# Patient Record
Sex: MALE | Race: BLACK OR AFRICAN AMERICAN | HISPANIC OR LATINO | Employment: UNEMPLOYED | URBAN - METROPOLITAN AREA
[De-identification: names, ages, dates, MRNs, and addresses within clinical notes are randomized per-mention and may not be internally consistent; named-entity substitution may affect disease eponyms.]

---

## 2023-05-28 ENCOUNTER — HOSPITAL ENCOUNTER (EMERGENCY)
Facility: HOSPITAL | Age: 33
Discharge: HOME | End: 2023-05-28
Attending: EMERGENCY MEDICINE | Admitting: EMERGENCY MEDICINE

## 2023-05-28 VITALS
TEMPERATURE: 99.1 F | OXYGEN SATURATION: 99 % | WEIGHT: 215.5 LBS | HEART RATE: 94 BPM | RESPIRATION RATE: 18 BRPM | SYSTOLIC BLOOD PRESSURE: 138 MMHG | DIASTOLIC BLOOD PRESSURE: 88 MMHG

## 2023-05-28 DIAGNOSIS — J06.9 UPPER RESPIRATORY TRACT INFECTION, UNSPECIFIED TYPE: Primary | ICD-10-CM

## 2023-05-28 LAB
FLUAV RNA RESP QL NAA+PROBE: NEGATIVE
FLUBV RNA RESP QL NAA+PROBE: NEGATIVE
RSV RNA RESP QL NAA+PROBE: NEGATIVE
S PYO DNA THROAT QL NAA+PROBE: NOT DETECTED
SARS-COV-2 RNA RESP QL NAA+PROBE: NEGATIVE

## 2023-05-28 RX ORDER — ERYTHROMYCIN 5 MG/G
OINTMENT OPHTHALMIC
Qty: 3.5 G | Refills: 0 | OUTPATIENT
Start: 2023-05-28 | End: 2023-06-01

## 2023-05-28 NOTE — Clinical Note
Naya Angle was seen and treated in our emergency department on 5/28/2023  No restrictions            Diagnosis:     Rogers    He may return on this date: 05/29/2023         If you have any questions or concerns, please don't hesitate to call        Kylah Singh PA-C    ______________________________           _______________          _______________  Hospital Representative                              Date                                Time

## 2023-05-28 NOTE — ED PROVIDER NOTES
HPI:    Patient is a 26-year-old male, comes in having flulike symptoms, as well as bilateral eye erythema  Patient is in no acute distress at this time  No fever, no nausea, no vomiting  Has been taking Visine without relief      No Known Allergies    History reviewed  No pertinent past medical history  History reviewed  No pertinent surgical history  Social History     Tobacco Use   • Smoking status: Never   • Smokeless tobacco: Never   Substance Use Topics   • Alcohol use: Yes   • Drug use: Never         Nursing notes reviewed        ED Triage Vitals [05/28/23 1708]   Temperature Pulse Respirations Blood Pressure SpO2   99 1 °F (37 3 °C) 94 18 138/88 99 %      Temp Source Heart Rate Source Patient Position - Orthostatic VS BP Location FiO2 (%)   Tympanic Monitor Sitting Left arm --      Pain Score       --           ROS: Positive for conjunctivits, sore throat, fatigue, the remainder of a 10 organ system ROS was otherwise unremarkable  Physical Exam:  General: awake, alert, no acute distress  Head: normocephalic, atraumatic  Eyes: no scleral icterus  Ears: external ears normal, hearing grossly intact  Nose: external exam grossly normal  Mouth: negative tonsillar swelling, positive tonsillar erythema, negative tonsillar exudate  Neck: symmetric, No JVD noted, trachea midline  Pulmonary: no respiratory distress, no tachypnea noted  Cardiovascular: appears well perfused  Abdomen: no distention noted, negative abdominal tenderness  Musculoskeletal: no deformities noted, tone normal  Neuro: grossly non-focal  Psych: mood and affect appropriate      No orders to display              Labs Reviewed - No data to display    Visit Vitals  /88 (BP Location: Left arm)   Pulse 94   Temp 99 1 °F (37 3 °C) (Tympanic)   Resp 18   Wt 97 8 kg (215 lb 8 oz)   SpO2 99%   Smoking Status Never                     MDM:    Patient came in for viral-like symptoms  Tested for COVID, flu, RSV, and strep    Given "erythromycin ointment for bilateral eye erythema  Discharged home with follow-up to family doctor as needed      Counseling: I had a detailed discussion with the patient and/or guardian regarding: the historical points, exam findings, and any diagnostic results supporting the discharge diagnosis, lab results, radiology results, discharge instructions reviewed with patient and/or family/caregiver and understanding was verbalized  Instructions given to return to the emergency department if symptoms worsen or persist, or if there are any questions or concerns that arise at home  All labs reviewed and utilized in the medical decision making process     All radiology studies independently viewed by me and interpreted by the radiologist     Portions of the record may have been created with voice recognition software  Occasional wrong word or \"sound a like\" substitutions may have occurred due to the inherent limitations of voice recognition software  Read the chart carefully and recognize, using context, where substitutions have occurred  Medications - No data to display  Final diagnoses:   Upper respiratory tract infection, unspecified type     Time reflects when diagnosis was documented in both MDM as applicable and the Disposition within this note     Time User Action Codes Description Comment    5/28/2023  5:16 PM Christin Roberts Add [J06 9] Upper respiratory tract infection, unspecified type       ED Disposition     ED Disposition   Discharge    Condition   Stable    Date/Time   Sun May 28, 2023  5:16 PM    Comment   Rogers Gay discharge to home/self care                 Follow-up Information     Follow up With Specialties Details Why Contact Info Additional [id number] [facility] Emergency Department Emergency Medicine  As needed, If symptoms worsen [address] [id number] [address] [facility] Emergency Department    " Discharge Medication List as of 5/28/2023  5:16 PM      START taking these medications    Details   erythromycin (ILOTYCIN) ophthalmic ointment Place a 1/2 inch ribbon of ointment into the lower eyelid  Q6hrs, Print           No discharge procedures on file      Electronically Signed by               Linda Worthy PA-C  05/28/23 6758

## 2023-06-01 ENCOUNTER — HOSPITAL ENCOUNTER (EMERGENCY)
Facility: HOSPITAL | Age: 33
Discharge: HOME | End: 2023-06-01
Attending: EMERGENCY MEDICINE | Admitting: EMERGENCY MEDICINE

## 2023-06-01 VITALS
RESPIRATION RATE: 16 BRPM | DIASTOLIC BLOOD PRESSURE: 93 MMHG | HEART RATE: 83 BPM | WEIGHT: 215 LBS | TEMPERATURE: 98.6 F | OXYGEN SATURATION: 98 % | SYSTOLIC BLOOD PRESSURE: 138 MMHG

## 2023-06-01 DIAGNOSIS — Z91.09 ENVIRONMENTAL ALLERGIES: Primary | ICD-10-CM

## 2023-06-01 PROCEDURE — 99282 EMERGENCY DEPT VISIT SF MDM: CPT

## 2023-06-01 RX ORDER — OLOPATADINE HYDROCHLORIDE 1 MG/ML
1 SOLUTION/ DROPS OPHTHALMIC 2 TIMES DAILY
Qty: 5 ML | Refills: 0 | Status: SHIPPED | OUTPATIENT
Start: 2023-06-01

## 2023-06-01 RX ORDER — FLUTICASONE PROPIONATE 50 MCG
2 SPRAY, SUSPENSION (ML) NASAL DAILY
Qty: 16 G | Refills: 0 | Status: SHIPPED | OUTPATIENT
Start: 2023-06-01

## 2023-06-02 NOTE — DISCHARGE INSTRUCTIONS
llergies  QUE SOUHAITEZ-VOUS SAVOIR:  Les allergies sont une réaction du système immunitaire à une substance appelée allergène  Votre système immunitaire considère l'allergène comme nocif et [employer]  Une réaction allergique peut être bénigne ou potentiellement mortelle  Une réaction potentiellement mortelle est appelée anaphylaxie  L'anaphylaxie est une réaction soudaine et potentiellement mortelle abelino nécessite un traitement immédiat  INSTRUCTIONS DE DÉCHARGE :  Composez le 911 pour [employer] ou sivakumar symptômes d'anaphylaxie, tels que sivakumar difficultés respiratoires, un gonflement de la bouche ou de la gorge ou une respiration sifflante  Vous pouvez Bill Vogt, une éruption cutanée, de l'urticaire ou avoir l'impression que vous allez vous évanouir  Retournez aux urgences si :   Vous avez sivakumar [employer] [facility] irineo ou les pieds  Votre peau est rouge ou rouge  Communiquez avec votre fournisseur de soins de santé si :   Juaquin Yap sivakumar questions ou sivakumar inquiétudes concernant votre état ou kade soins  Médicaments:   Les antihistaminiques aident à réduire [place], les éternuements et l'enflure  Vous pouvez les prendre sous forme de pilule ou utiliser sivakumar Vitaldent raciel ou les yeux  Les décongestionnants aident votre raciel à se sentir moins bouché  Les traitements topiques aident à réduire [place] ou l'enflure  Vous pouvez également recevoir sivakumar vaporisateurs nasaux ou sivakumar gouttes pour les yeux  L'épinéphrine est utilisée pour traiter [facility] réaction allergique grave comme l'anaphylaxie  [address]  Contactez votre fournisseur de soins de santé si vous pensez que votre médicament ne vous aide pas ou si vous avez sivakumar effets secondaires  Dites à votre fournisseur si vous êtes allergique à un médicament  Gardez une [employer] [place] [employer] [place], sivakumar vitamines et sivakumar herbes que Diann Brenner   Incluez les padma, et quand et pourquoi vous les prenez  Apportez la liste ou les flacons de pilules aux visites de Willis  Emportez votre liste de médicaments avec vous en valeria d'urgence  Étapes à suivre en valeria de signes ou de symptômes d'anaphylaxie :   Administrez immédiatement 1 dose d'épinéphrine uniquement dans le muscle extérieur de la cuisse  Laissez le projectile en place [facility] fournisseur de soins de santé peut vous recommander de le laisser en place jusqu'à 8 secondes mari de le retirer  Priscila permet de s'assurer que toute l'épinéphrine est délivrée  Composez le 911 et [employer], même si la piqûre a amélioré les symptômes  Ne conduisez pas vous-même  Apportez l'injection d'épinéphrine [employer]  Précautions à prendre si vous êtes à risque d'anaphylaxie :   [place] 2 injections d'épinéphrine nii vous en tout temps  Vous aurez peut-être besoin [employer] injection, car l'épinéphrine ne fonctionne que pendant environ 20 minutes et les symptômes peuvent réapparaître  Votre fournisseur de soins de TRHydra Dx vo montrer, à vous et aux membres de votre famil, comment administrer la piqûre  Vérifiez la date d'expiration tous les mois et remplacez-la mari qu'meagan n'  Créer un plan d'action  Votre fournisseur de [employer] Company de [email or web address] vo aider à créer un plan écrit abelino explique l'allergie et un plan d'urgence pour traiter Vance Navin  Le plan explique quand administrer une seconde injection d'épinéphrine si les symptômes réapparaissent ou ne s'améliorent pas après la première  Remettez sivakumar copies du plan d'action et sivakumar instructions d'urgence aux membres de la famille et au personnel de [place]  Montrez-leur comment administrer une injection d'épinéphrine  Soyez prudent lorsque vous faites de l'exercice  Si vous avez eu une anaphylaxie induite par l'exercice, ne faites pas d'exercice connie après avoir mangé   Arrêtez immédiatement de faire de l'exercice si vous commencez à développer sivakumar signes ou sivakumar symptômes d'anaphylaxie  Vous pouvez d'abord vous sentir fatigué, avoir chaud ou avoir la peau abelino démange  De l'urticaire, un gonflement et de graves problèmes respiratoires peuvent se développer si vous continuez à faire de l'exercice  Brendolyn Phoenix nii vous une identification [employer]  Portez sivakumar bijoux d'alerte médicale ou munissez-vous d'une carte expliquant l'allergie  Demandez à votre fournisseur de soins de santé où obtenir victor manuel articles  Informez tous les fournisseurs de soins de santé de l'allergie  Priscila comprend les dentistes, les infirmières, les [employer]  Gérer les allergies :   Sima Shaikh les rinçages nasaux comme indiqué  Rincer quotidiennement avec une solution saline  Priscila aidera à éliminer les allergènes de votre raciel  Sima Shaikh de l'eau distillée si possible  Vous pouvez également faire bouillir l'eau du robinet et la laisser refroidir mari de l'utiliser  N'utilisez pas d'eau du robinet abelino n'a pas [place]  Ne pas fumer  Les symptômes d'allergie peuvent diminuer si vous ne fumez pas  La nicotine et les autres produits chimiques contenus dans les cigarettes et les cigares [employer] poumons  Demandez à votre fournisseur de soins de santé si vous fumez actuellement et avez besoin d'aide pour arrêter de fumer  Les cigarettes électroniques ou le tabac sans fumée contiennent encore de la nicotine  Parlez-en à Bisi berrios'utiliser [facility]  Prévenir les réactions allergiques :   Ne sortez pas lorsque le taux de pollen est élevé si vous souffrez d'allergies saisonnières  Jazmine symptômes peuvent être atténués si vous ne sortez que le donovan ou le soir  Sima Shaikh votre climatiseur et [employer] à air  Évitez la poussière, la fourrure et [employer]  Époussetez et passez souvent l'aspirateur [employer]   Sophie nelson peut-être eli un masque lorsque sophie santoserez l'aspirateur  Gardez les animaux domestiques dans certaines pièces et baignez-les souvent  Alison Beckers un déshumidificateur (machine abelino diminue l'humidité) pour aider à prévenir la moisissure  N'utilisez pas de [employer] latex si vous êtes allergique au latex  Alison Beckers sivakumar gants sans latex si vous travaillez dans le secteur de la santé ou dans la préparation sivakumar aliments  Informez toujours les fournisseurs de soins de santé d'une allergie au latex  Évitez les endroits abelino attirent les insectes si vous êtes allergique aux piqûres ou aux piqûres d'insectes  Les zones [employer], les jardins et les pique-niques   Ne portez pas de vêtements clairs ou de parfum fort

## 2023-06-02 NOTE — ED PROVIDER NOTES
History  Chief Complaint   Patient presents with   • Nasal Congestion     Nasal congestion today  Seen a few days ago for the same  Patient is a 20-year-old male  He is generally healthy  He was seen here couple days ago for URI symptoms  He had red eyes  He tested negative for COVID and flu  Negative for strep  He was treated with erythromycin ointment  He was recommended saline nasal spray  He has been taking Zyrtec  He was diagnosed with an upper respiratory infection  He returns now because he really is not getting any better  Has been sick for about a month now altogether  Mostly nasal congestion and bilateral itchy red eyes  No cough  No chest pain  No rash  He has no history of asthma  No vision loss  No eye drainage  Prior to Admission Medications   Prescriptions Last Dose Informant Patient Reported? Taking?   erythromycin (ILOTYCIN) ophthalmic ointment   No Yes   Sig: Place a 1/2 inch ribbon of ointment into the lower eyelid  Q6hrs      Facility-Administered Medications: None       History reviewed  No pertinent past medical history  History reviewed  No pertinent surgical history  History reviewed  No pertinent family history  I have reviewed and agree with the history as documented  E-Cigarette/Vaping     E-Cigarette/Vaping Substances     Social History     Tobacco Use   • Smoking status: Never   • Smokeless tobacco: Never   Substance Use Topics   • Alcohol use: Yes   • Drug use: Never       Review of Systems   Constitutional: Negative for chills and fever  HENT: Positive for congestion  Negative for rhinorrhea and sore throat  Eyes: Positive for redness  Negative for pain and visual disturbance  Respiratory: Negative for cough and shortness of breath  Cardiovascular: Negative for chest pain and leg swelling  Gastrointestinal: Negative for abdominal pain, diarrhea and vomiting  Endocrine: Negative for polydipsia and polyuria     Genitourinary: Negative for dysuria, frequency and hematuria  Musculoskeletal: Negative for back pain and neck pain  Skin: Negative for rash and wound  Allergic/Immunologic: Negative for immunocompromised state  Neurological: Negative for weakness, numbness and headaches  Psychiatric/Behavioral: Negative for hallucinations and suicidal ideas  All other systems reviewed and are negative  Physical Exam  Physical Exam  Vitals reviewed  Constitutional:       General: He is not in acute distress  Appearance: He is not toxic-appearing  HENT:      Head: Normocephalic and atraumatic  Nose: Nose normal       Mouth/Throat:      Mouth: Mucous membranes are moist    Eyes:      General:         Right eye: No discharge  Left eye: No discharge  Extraocular Movements: Extraocular movements intact  Pupils: Pupils are equal, round, and reactive to light  Comments: Conjunctive a is injected bilaterally  Cardiovascular:      Rate and Rhythm: Normal rate and regular rhythm  Pulses: Normal pulses  Heart sounds: Normal heart sounds  No murmur heard  No friction rub  No gallop  Pulmonary:      Effort: Pulmonary effort is normal  No respiratory distress  Breath sounds: Normal breath sounds  No stridor  No wheezing, rhonchi or rales  Abdominal:      General: Bowel sounds are normal  There is no distension  Palpations: Abdomen is soft  Tenderness: There is no abdominal tenderness  There is no right CVA tenderness, left CVA tenderness, guarding or rebound  Musculoskeletal:         General: No swelling, tenderness, deformity or signs of injury  Normal range of motion  Cervical back: Normal range of motion and neck supple  No rigidity  Right lower leg: No edema  Left lower leg: No edema  Comments: No calf pain or unilateral leg swelling   Skin:     General: Skin is warm and dry  Coloration: Skin is not jaundiced or pale        Findings: No bruising, erythema or rash  Neurological:      General: No focal deficit present  Mental Status: He is alert and oriented to person, place, and time  Cranial Nerves: No facial asymmetry  Sensory: No sensory deficit  Motor: Motor function is intact  Psychiatric:         Mood and Affect: Mood normal          Behavior: Behavior normal          Vital Signs  ED Triage Vitals [06/01/23 2230]   Temperature Pulse Respirations Blood Pressure SpO2   98 6 °F (37 °C) 83 16 138/93 98 %      Temp Source Heart Rate Source Patient Position - Orthostatic VS BP Location FiO2 (%)   Oral Monitor Sitting Right arm --      Pain Score       No Pain           Vitals:    06/01/23 2230   BP: 138/93   Pulse: 83   Patient Position - Orthostatic VS: Sitting         Visual Acuity      ED Medications  Medications - No data to display    Diagnostic Studies  Results Reviewed     None                 No orders to display              Procedures  Procedures         ED Course                               SBIRT 22yo+    Flowsheet Row Most Recent Value   Initial Alcohol Screen: US AUDIT-C     1  How often do you have a drink containing alcohol? 0 Filed at: 06/01/2023 2235   2  How many drinks containing alcohol do you have on a typical day you are drinking? 0 Filed at: 06/01/2023 2235   3a  Male UNDER 65: How often do you have five or more drinks on one occasion? 0 Filed at: 06/01/2023 2235   3b  FEMALE Any Age, or MALE 65+: How often do you have 4 or more drinks on one occassion? 0 Filed at: 06/01/2023 2235   Audit-C Score 0 Filed at: 06/01/2023 2235   KAYE: How many times in the past year have you    Used an illegal drug or used a prescription medication for non-medical reasons? Never Filed at: 06/01/2023 2235                    Medical Decision Making  More consistent with allergies  Doubt bacterial sinusitis, viral URI, asthma, or other causes of patient's symptoms      Amount and/or Complexity of Data Reviewed  External Data Reviewed: labs and notes  Risk  OTC drugs  Disposition  Final diagnoses:   Environmental allergies     Time reflects when diagnosis was documented in both MDM as applicable and the Disposition within this note     Time User Action Codes Description Comment    6/1/2023 10:43 PM Deisy Padroni Erin [Z91 09] Environmental allergies       ED Disposition     ED Disposition   Discharge    Condition   Stable    Date/Time   Thu Jun 1, 2023 10:43 PM    Comment   Rogers Deidra discharge to home/self care  Follow-up Information     Follow up With Specialties Details Why [address] Family Medicine In 1 week  [address] [place] [address]  [phone]            Patient's Medications   Discharge Prescriptions    FLUTICASONE (FLONASE) 50 MCG/ACT NASAL SPRAY    2 sprays into each nostril daily       Start Date: 6/1/2023  End Date: --       Order Dose: 2 sprays       Quantity: 16 g    Refills: 0    LORATADINE-PSEUDOEPHEDRINE (CLARITIN-D 24-HOUR)  MG PER 24 HR TABLET    Take 1 tablet by mouth daily       Start Date: 6/1/2023  End Date: --       Order Dose: 1 tablet       Quantity: 30 tablet    Refills: 0    OLOPATADINE (PATANOL) 0 1 % OPHTHALMIC SOLUTION    Administer 1 drop to both eyes 2 (two) times a day       Start Date: 6/1/2023  End Date: --       Order Dose: 1 drop       Quantity: 5 mL    Refills: 0       No discharge procedures on file      PDMP Review     None          ED Provider  Electronically Signed by           Laura Galvez MD  06/01/23 5770

## 2023-11-03 ENCOUNTER — HOSPITAL ENCOUNTER (EMERGENCY)
Facility: HOSPITAL | Age: 33
Discharge: HOME | End: 2023-11-03
Attending: EMERGENCY MEDICINE

## 2023-11-03 ENCOUNTER — APPOINTMENT (EMERGENCY)
Dept: RADIOLOGY | Facility: HOSPITAL | Age: 33
End: 2023-11-03

## 2023-11-03 VITALS
SYSTOLIC BLOOD PRESSURE: 144 MMHG | OXYGEN SATURATION: 100 % | TEMPERATURE: 97.7 F | RESPIRATION RATE: 18 BRPM | HEART RATE: 78 BPM | DIASTOLIC BLOOD PRESSURE: 94 MMHG

## 2023-11-03 DIAGNOSIS — I95.1 ORTHOSTATIC HYPOTENSION: Primary | ICD-10-CM

## 2023-11-03 LAB
ALBUMIN SERPL BCP-MCNC: 5.2 G/DL (ref 3.5–5)
ALP SERPL-CCNC: 85 U/L (ref 34–104)
ALT SERPL W P-5'-P-CCNC: 26 U/L (ref 7–52)
ANION GAP SERPL CALCULATED.3IONS-SCNC: 8 MMOL/L
AST SERPL W P-5'-P-CCNC: 20 U/L (ref 13–39)
BASOPHILS # BLD AUTO: 0.03 THOUSANDS/ÂΜL (ref 0–0.1)
BASOPHILS NFR BLD AUTO: 0 % (ref 0–1)
BILIRUB SERPL-MCNC: 0.45 MG/DL (ref 0.2–1)
BUN SERPL-MCNC: 11 MG/DL (ref 5–25)
CALCIUM SERPL-MCNC: 9.7 MG/DL (ref 8.4–10.2)
CARDIAC TROPONIN I PNL SERPL HS: 2 NG/L
CHLORIDE SERPL-SCNC: 103 MMOL/L (ref 96–108)
CO2 SERPL-SCNC: 29 MMOL/L (ref 21–32)
CREAT SERPL-MCNC: 0.89 MG/DL (ref 0.6–1.3)
EOSINOPHIL # BLD AUTO: 0.3 THOUSAND/ÂΜL (ref 0–0.61)
EOSINOPHIL NFR BLD AUTO: 4 % (ref 0–6)
ERYTHROCYTE [DISTWIDTH] IN BLOOD BY AUTOMATED COUNT: 14 % (ref 11.6–15.1)
GFR SERPL CREATININE-BSD FRML MDRD: 110 ML/MIN/1.73SQ M
GLUCOSE SERPL-MCNC: 75 MG/DL (ref 65–140)
HCT VFR BLD AUTO: 47 % (ref 36.5–49.3)
HGB BLD-MCNC: 15.9 G/DL (ref 12–17)
IMM GRANULOCYTES # BLD AUTO: 0.01 THOUSAND/UL (ref 0–0.2)
IMM GRANULOCYTES NFR BLD AUTO: 0 % (ref 0–2)
LYMPHOCYTES # BLD AUTO: 3.09 THOUSANDS/ÂΜL (ref 0.6–4.47)
LYMPHOCYTES NFR BLD AUTO: 39 % (ref 14–44)
MCH RBC QN AUTO: 28.8 PG (ref 26.8–34.3)
MCHC RBC AUTO-ENTMCNC: 33.8 G/DL (ref 31.4–37.4)
MCV RBC AUTO: 85 FL (ref 82–98)
MONOCYTES # BLD AUTO: 0.59 THOUSAND/ÂΜL (ref 0.17–1.22)
MONOCYTES NFR BLD AUTO: 8 % (ref 4–12)
NEUTROPHILS # BLD AUTO: 3.88 THOUSANDS/ÂΜL (ref 1.85–7.62)
NEUTS SEG NFR BLD AUTO: 49 % (ref 43–75)
NRBC BLD AUTO-RTO: 0 /100 WBCS
PLATELET # BLD AUTO: 253 THOUSANDS/UL (ref 149–390)
PMV BLD AUTO: 8.4 FL (ref 8.9–12.7)
POTASSIUM SERPL-SCNC: 3.5 MMOL/L (ref 3.5–5.3)
PROT SERPL-MCNC: 7.7 G/DL (ref 6.4–8.4)
RBC # BLD AUTO: 5.53 MILLION/UL (ref 3.88–5.62)
SODIUM SERPL-SCNC: 140 MMOL/L (ref 135–147)
WBC # BLD AUTO: 7.9 THOUSAND/UL (ref 4.31–10.16)

## 2023-11-03 PROCEDURE — 36415 COLL VENOUS BLD VENIPUNCTURE: CPT | Performed by: EMERGENCY MEDICINE

## 2023-11-03 PROCEDURE — 84484 ASSAY OF TROPONIN QUANT: CPT | Performed by: EMERGENCY MEDICINE

## 2023-11-03 PROCEDURE — 80053 COMPREHEN METABOLIC PANEL: CPT | Performed by: EMERGENCY MEDICINE

## 2023-11-03 PROCEDURE — 99284 EMERGENCY DEPT VISIT MOD MDM: CPT

## 2023-11-03 PROCEDURE — 96360 HYDRATION IV INFUSION INIT: CPT

## 2023-11-03 PROCEDURE — 85025 COMPLETE CBC W/AUTO DIFF WBC: CPT | Performed by: EMERGENCY MEDICINE

## 2023-11-03 PROCEDURE — 99284 EMERGENCY DEPT VISIT MOD MDM: CPT | Performed by: EMERGENCY MEDICINE

## 2023-11-03 PROCEDURE — 93005 ELECTROCARDIOGRAM TRACING: CPT

## 2023-11-03 PROCEDURE — 71045 X-RAY EXAM CHEST 1 VIEW: CPT

## 2023-11-03 RX ADMIN — SODIUM CHLORIDE 1000 ML: 0.9 INJECTION, SOLUTION INTRAVENOUS at 18:25

## 2023-11-03 NOTE — ED ATTENDING ATTESTATION
11/3/2023  IOsmar DO, saw and evaluated the patient. I have discussed the patient with the resident/non-physician practitioner and agree with the resident's/non-physician practitioner's findings, Plan of Care, and MDM as documented in the resident's/non-physician practitioner's note, except where noted. All available labs and Radiology studies were reviewed. I was present for key portions of any procedure(s) performed by the resident/non-physician practitioner and I was immediately available to provide assistance. At this point I agree with the current assessment done in the Emergency Department. I have conducted an independent evaluation of this patient a history and physical is as follows:    Patient is a 68-year-old male with no significant past medical history who presents with headedness. Patient states that he has had lightheadedness for the past 2 days. He states that he is asymptomatic at rest, but feels lightheaded with change in position. He admits to intermittent back pain for several months. He describes upper back pain which is worse with prolonged standing at work. He states that the pain resolves when he is sitting or laying. He denies chest pain, shortness of breath, lower extremity edema, other concerns. Of note, patient has been on a diet for the past 2 days. He describes eating and drinking less than normal.    On exam, patient is in no acute distress. Heart is regular rate and rhythm. Breath sounds normal.  Thoracic back nontender to palpation. Abdomen is soft, nontender, nondistended. No rebound or guarding. No lower extremity edema.     ED Course         Critical Care Time  Procedures Recommended p.o. hydration at home and outpatient follow-up. Recommend return to ED if symptoms worsen. Portions of the above record have been created with voice recognition software. Occasional wrong word or "sound alike" substitutions may have occurred due to the inherent limitations of voice recognition software. Read the chart carefully and recognize, using context, where substitutions may have occurred.       ED Course         Critical Care Time  Procedures

## 2023-11-03 NOTE — ED PROVIDER NOTES
History  Chief Complaint   Patient presents with    Dizziness     Pt states that x2 days he has been dizzy at work when he stands too long. Denies chest pain and n/v/d     (Rogers Diazite) Yolanda Gay is a 26 y.o. male who identifies as male    They presented to the emergency department on November 3, 2023. Patient presents with:  Dizziness: Pt states that x2 days he has been dizzy at work when he stands too long. Denies chest pain and n/v/d  . The patient states that 2 days, he has been feeling lightheaded whenever he stood up at work for a longer period of time. Patient denies shortness of breath, chest pain, nausea, vomiting, diarrhea, recent illness or any other complaint at this time. Allergies include:  No Known Allergies      Immunizations: There is no immunization history on file for this patient. Immunizations Reviewed. Prior to Admission Medications   Prescriptions Last Dose Informant Patient Reported? Taking?   fluticasone (FLONASE) 50 mcg/act nasal spray   No No   Si sprays into each nostril daily   loratadine-pseudoephedrine (CLARITIN-D 24-HOUR)  mg per 24 hr tablet   No No   Sig: Take 1 tablet by mouth daily   olopatadine (PATANOL) 0.1 % ophthalmic solution   No No   Sig: Administer 1 drop to both eyes 2 (two) times a day      Facility-Administered Medications: None       History reviewed. No pertinent past medical history. History reviewed. No pertinent surgical history. History reviewed. No pertinent family history. I have reviewed and agree with the history as documented. E-Cigarette/Vaping     E-Cigarette/Vaping Substances     Social History     Tobacco Use    Smoking status: Never    Smokeless tobacco: Never   Substance Use Topics    Alcohol use: Yes     Comment: socially    Drug use: Never        Review of Systems   Constitutional:  Negative for chills and fever. HENT:  Negative for ear pain and sore throat.     Eyes:  Negative for pain and visual disturbance. Respiratory:  Negative for cough and shortness of breath. Cardiovascular:  Negative for chest pain and palpitations. Gastrointestinal:  Negative for abdominal pain and vomiting. Genitourinary:  Negative for dysuria and hematuria. Musculoskeletal:  Negative for arthralgias and back pain. Skin:  Negative for color change and rash. Neurological:  Positive for dizziness. Negative for seizures and syncope. All other systems reviewed and are negative. Physical Exam  ED Triage Vitals [11/03/23 1642]   Temperature Pulse Respirations Blood Pressure SpO2   97.7 °F (36.5 °C) 78 18 144/94 100 %      Temp Source Heart Rate Source Patient Position - Orthostatic VS BP Location FiO2 (%)   Oral Monitor Sitting Right arm --      Pain Score       No Pain             Orthostatic Vital Signs  Vitals:    11/03/23 1642   BP: 144/94   Pulse: 78   Patient Position - Orthostatic VS: Sitting       Physical Exam  Vitals and nursing note reviewed. Constitutional:       General: He is not in acute distress. Appearance: He is well-developed. HENT:      Head: Normocephalic and atraumatic. Eyes:      Conjunctiva/sclera: Conjunctivae normal.   Cardiovascular:      Rate and Rhythm: Normal rate and regular rhythm. Heart sounds: No murmur heard. Pulmonary:      Effort: Pulmonary effort is normal. No respiratory distress. Breath sounds: Normal breath sounds. Abdominal:      Palpations: Abdomen is soft. Tenderness: There is no abdominal tenderness. Musculoskeletal:         General: No swelling. Cervical back: Neck supple. Skin:     General: Skin is warm and dry. Capillary Refill: Capillary refill takes less than 2 seconds. Neurological:      Mental Status: He is alert.    Psychiatric:         Mood and Affect: Mood normal.         ED Medications  Medications   sodium chloride 0.9 % bolus 1,000 mL (0 mL Intravenous Stopped 11/3/23 1936)       Diagnostic Studies  Results Reviewed       Procedure Component Value Units Date/Time    HS Troponin 0hr (reflex protocol) [[id number]]  (Normal) Collected: 11/03/23 1658    Lab Status: Final result Specimen: Blood from Arm, Right Updated: 11/03/23 1731     hs TnI 0hr 2 ng/L     Comprehensive metabolic panel [[id number]]  (Abnormal) Collected: 11/03/23 1658    Lab Status: Final result Specimen: Blood from Arm, Right Updated: 11/03/23 1727     Sodium 140 mmol/L      Potassium 3.5 mmol/L      Chloride 103 mmol/L      CO2 29 mmol/L      ANION GAP 8 mmol/L      BUN 11 mg/dL      Creatinine 0.89 mg/dL      Glucose 75 mg/dL      Calcium 9.7 mg/dL      AST 20 U/L      ALT 26 U/L      Alkaline Phosphatase 85 U/L      Total Protein 7.7 g/dL      Albumin 5.2 g/dL      Total Bilirubin 0.45 mg/dL      eGFR 110 ml/min/1.73sq m     Narrative:      [employer] guidelines for Chronic Kidney Disease (CKD):     Stage 1 with normal or high GFR (GFR > 90 mL/min/1.73 square meters)    Stage 2 Mild CKD (GFR = 60-89 mL/min/1.73 square meters)    Stage 3A Moderate CKD (GFR = 45-59 mL/min/1.73 square meters)    Stage 3B Moderate CKD (GFR = 30-44 mL/min/1.73 square meters)    Stage 4 Severe CKD (GFR = 15-29 mL/min/1.73 square meters)    Stage 5 End Stage CKD (GFR <15 mL/min/1.73 square meters)  Note: GFR calculation is accurate only with a steady state creatinine    CBC and differential [[id number]]  (Abnormal) Collected: 11/03/23 1658    Lab Status: Final result Specimen: Blood from Arm, Right Updated: 11/03/23 1709     WBC 7.90 Thousand/uL      RBC 5.53 Million/uL      Hemoglobin 15.9 g/dL      Hematocrit 47.0 %      MCV 85 fL      MCH 28.8 pg      MCHC 33.8 g/dL      RDW 14.0 %      MPV 8.4 fL      Platelets 481 Thousands/uL      nRBC 0 /100 WBCs      Neutrophils Relative 49 %      Immat GRANS % 0 %      Lymphocytes Relative 39 %      Monocytes Relative 8 %      Eosinophils Relative 4 %      Basophils Relative 0 %      Neutrophils Absolute 3.88 Thousands/µL      Immature Grans Absolute 0.01 Thousand/uL      Lymphocytes Absolute 3.09 Thousands/µL      Monocytes Absolute 0.59 Thousand/µL      Eosinophils Absolute 0.30 Thousand/µL      Basophils Absolute 0.03 Thousands/µL                    XR chest 1 view portable   ED Interpretation by Mishel Will MD (11/03 1848)   This was independently interpreted by me. No acute cardiopulmonary abnormality. Procedures  ECG 12 Lead Documentation Only    Date/Time: 11/3/2023 8:08 PM    Performed by: Mishel Will MD  Authorized by: Mishel Will MD    Indications / Diagnosis:  Lightheadeadness  ECG reviewed by me, the ED Provider: yes    Patient location:  ED  Previous ECG:     Previous ECG:  Unavailable    Comparison to cardiac monitor: Yes    Interpretation:     Interpretation: normal    Rate:     ECG rate:  76 bmp    ECG rate assessment: normal    Rhythm:     Rhythm: sinus rhythm    Ectopy:     Ectopy: none    QRS:     QRS axis:  Normal    QRS intervals:  Normal  Conduction:     Conduction: normal    ST segments:     ST segments:  Normal  T waves:     T waves: normal    Comments:      Ventricular rate 76 bpm, pr interval 162 ms, rest 70, , QTc 398,  No acute ST segment elevation or depression. Normal sinus rhythm. ED Course  ED Course as of 11/03/23 2013 Fri Nov 03, 2023   257 25-year-old male no significant past medical history presented for evaluation of lightheadedness. 1846 Ordered CBC CMP EKG chest x-ray troponin   1847 Comprehensive metabolic panel(!)  Wnl, normal anion gap, no EKTA normal T. bili. 1847 CBC and differential(!)  No white count, normal H&H.   1847 hs TnI 0hr: 2  Discontinued second and fourth hour. 1848 On further evaluation patient reported that he has been on strict diet for the last 2 days where he will eat a little and drink minimal amount of fluid.   Based on patient's unremarkable work-up, I suspect patient's symptoms is due to orthostatic hypotension. Patient given 1 L of bolus fluid. 2013 Patient discharged with return precautions. Medical Decision Making  Amount and/or Complexity of Data Reviewed  Labs: ordered. Decision-making details documented in ED Course. Radiology: ordered and independent interpretation performed. Disposition  Final diagnoses:   Orthostatic hypotension     Time reflects when diagnosis was documented in both MDM as applicable and the Disposition within this note       Time User Action Codes Description Comment    11/3/2023  6:52 PM Jorge Luis Bae Add [I95.1] Orthostatic hypotension           ED Disposition       ED Disposition   Discharge    Condition   Stable    Date/Time   Fri Nov 3, 2023  6:52 PM    Comment   Rogers Gay discharge to home/self care. Follow-up Information       Follow up With Specialties Details Why Contact Info Additional [address] [facility]  As needed [address] [place] [id number]  [address] [place] [address])[facility]   [phone]            Discharge Medication List as of 11/3/2023  6:55 PM        CONTINUE these medications which have NOT CHANGED    Details   fluticasone (FLONASE) 50 mcg/act nasal spray 2 sprays into each nostril daily, Starting Thu 6/1/2023, Print      loratadine-pseudoephedrine (CLARITIN-D 24-HOUR)  mg per 24 hr tablet Take 1 tablet by mouth daily, Starting Thu 6/1/2023, Print      olopatadine (PATANOL) 0.1 % ophthalmic solution Administer 1 drop to both eyes 2 (two) times a day, Starting Thu 6/1/2023, Print           No discharge procedures on file. PDMP Review       None             ED Provider  Attending physically available and evaluated Rogers Gay.  I managed the patient along with the ED Attending.     Electronically Signed by           Marie Sheriff MD  11/03/23 2013

## 2023-11-03 NOTE — DISCHARGE INSTRUCTIONS
Your work-up today in the emergency department are unremarkable. Drink plenty of fluid throughout the day to stay hydrated. Return sooner to the ED if you experience worsening symptoms.

## 2023-11-05 LAB
ATRIAL RATE: 76 BPM
P AXIS: 72 DEGREES
PR INTERVAL: 162 MS
QRS AXIS: 61 DEGREES
QRSD INTERVAL: 70 MS
QT INTERVAL: 354 MS
QTC INTERVAL: 398 MS
T WAVE AXIS: 57 DEGREES
VENTRICULAR RATE: 76 BPM

## 2023-11-05 PROCEDURE — 93010 ELECTROCARDIOGRAM REPORT: CPT | Performed by: INTERNAL MEDICINE
